# Patient Record
Sex: MALE | Race: WHITE | Employment: FULL TIME | URBAN - METROPOLITAN AREA
[De-identification: names, ages, dates, MRNs, and addresses within clinical notes are randomized per-mention and may not be internally consistent; named-entity substitution may affect disease eponyms.]

---

## 2022-03-03 ENCOUNTER — HOSPITAL ENCOUNTER (OUTPATIENT)
Dept: PHYSICAL THERAPY | Age: 32
Discharge: HOME | End: 2022-03-03
Payer: COMMERCIAL

## 2022-03-03 PROCEDURE — 97110 THERAPEUTIC EXERCISES: CPT

## 2022-03-03 PROCEDURE — 97161 PT EVAL LOW COMPLEX 20 MIN: CPT

## 2022-03-03 NOTE — PROGRESS NOTES
[facility]  [address] [place] [address] Marianne Vigil, [address] - Phone: [phone]  Fax: [phone] / [address] [facility]  Patient Name: Kemi Lamar : 1990   Medical   Diagnosis: Right leg pain [M79.604] Treatment Diagnosis: Closed Displaced Right Lateral Malleolus    Onset Date: DOI: 2022  DOS: 2022     Referral Source: NARESH Lugo [facility]): 3/3/2022   Prior Hospitalization: See medical history Provider #: [id number]   Prior Level of Function: Active Adult   Comorbidities: Unremarkable   Medications: Verified on Patient Summary List   The Plan of Care and following information is based on the information from the initial evaluation.   ==========================================================================================  Assessment / key information:  Pt is a 32year old male who presents to PT today status post closed displaced right lateral malleolus. The resulting condition has affected their ability to ambulate without difficulty. Patient with a Functional Status score of 47 on FOTO (Focused on Therapeutic Outcomes), which corresponds to a functional limitation of 53%. Pt was advised that he could perform therapy once a week until he could weight bear secondary to finances and authorized sessions. However, he  t Patient will benefit from skilled PT services to address these issues. Pt was provided a HEP today and educated regarding their diagnosis and prognosis.  Thank you for this referral.     ROM / Strength  []? Unable to assess                  AROM                      PROM                   Strength (1-5)      Right Left Right Right Right Left   Knee Flexion WFL NT  NT  NT  5 5     Extension WFL NT   NT  NT 5 5   Ankle Plantarflexion  40  55  42  NT 4- 5     Inversion 25 55 28  NT 4 5     Eversion 8 20 8  NT 4+ 5     Dorsiflexion (neutral) -5 10 -2  NT 5 5  Great Toe Extension  90  NT NT   NT  NT  NT     Flexion 30 NT NT  NT  NT NT         Girth Measurements in cm:      Malleoli Figure 8   Left 31  60   Right   28.5 59      ==========================================================================================  Eval Complexity: History: LOW Complexity : Zero comorbidities / personal factors that will impact the outcome / POCExam:MEDIUM Complexity : 3 Standardized tests and measures addressing body structure, function, activity limitation and / or participation in recreation  Presentation: LOW Complexity : Stable, uncomplicated  Clinical Decision Making:MEDIUM Complexity : FOTO score of 26-74Overall Complexity:LOW     Problem List: pain affecting function, decrease ROM, decrease strength, edema affecting function, impaired gait/ balance, decrease ADL/ functional abilitiies, decrease activity tolerance, decrease flexibility/ joint mobility and decrease transfer abilities   Treatment Plan may include any combination of the following: Therapeutic exercise, Therapeutic activities, Neuromuscular re-education, Physical agent/modality, Gait/balance training, Manual therapy, Patient education, Self Care training, Functional mobility training and Stair training  Patient / Family readiness to learn indicated by: asking questions, trying to perform skills and interest  Persons(s) to be included in education: patient (P)  Barriers to Learning/Limitations: None  Patient Goal (s): \"want to be able to snowboard again\"   Patient self reported health status: good  Rehabilitation Potential: excellent   Short Term Goals: To be accomplished in  4  weeks:  1. Pt will be compliant with HEP for symptom management at home. 2. Pt will demonstrate DF AROM to at least neutral to improve stance phase. 3. Pt will demonstrate Inversion AROM to at least 35 deg to improve gait training.  Long Term Goals: To be accomplished in  8  weeks:  1.  Pt will be independent with HEP at D/C for self management. 2. Pt will demonstrate DF AROM to at least 10 deg to improve stair negotiation. 3. Pt will ambulate 5 minutes without assistive device to progress to return to PLOF. 4. Pt will increase FOTO Functional Status score to 76 to decrease functional limitations. 5. Pt will demonstrate x10 standing heel raises to engage in age appropriate activities. Frequency / Duration:   Patient to be seen  2-3  times per week for 8  weeks:  Patient / Caregiver education and instruction: provided education regarding diagnosis and prognosis as well as details regarding treatment plain. activity modification, brace/ splint application and exercises  Therapist Signature: Jersey Stoner DPT Date: 0/3/0300   Certification Period: NA Time: 11:09 AM   ===========================================================================================  I certify that the above Physical Therapy Services are being furnished while the patient is under my care. I agree with the treatment plan and certify that this therapy is necessary. Physician Signature:        Date:       Time:     NARESH Marques  Please sign and return to [employer] or you may fax the signed copy to [phone]. Thank you.

## 2022-03-03 NOTE — PROGRESS NOTES
PHYSICAL THERAPY - DAILY TREATMENT NOTE    Patient Name: Laurie Klein        Date: 3/3/2022  : 1990   YES Patient  Verified  Visit #:     Insurance: Payor: Eleuterio Meza / Plan: [facility] PPO / Product Type: PPO /      In time: 496 Out time: 1050   Total Treatment Time: 35     Medicare/[facility] Time Tracking (below)   Total Timed Codes (min):  10 1:1 Treatment Time:  10     TREATMENT AREA =  Right Ankle    SUBJECTIVE    Pain Level (on 0 to 10 scale):  0  / 10   Medication Changes/New allergies or changes in medical history, any new surgeries or procedures? NO    If yes, update Summary List   Subjective Functional Status/Changes:  []  No changes reported   DOI: 2022 pt slipped on ice and fractured right fibular and tibia with torn ligaments. Had closed displaced fracture of lateral malleolous 2022. Reports recovery has been good and denies any drainage. Denies history of falls. Initially he did not think he broke it anything. However when attempting to stand he could not bear weight and went to ER. NWB for 10 weeks. MD is aware of patient's brace use. He states he only wear brace while ambulating. He takes it off at work. Denies any numbness/tingling in foot. Hobbies: snowboard, disc golf     HPI:  Symptoms:  Pain rating (0-10): Today: 0/10                        Best: 0/10                        Worst: 7/10                        [] Constant:                                          [] Intermittent:       History of Condition:      Aggravating Factors:       Alleviating Factors:      Previous Treatment:      PMHx:see chart     Social/Recreational/Work:     Functional Status  Prior level of function:  Present functional limitations:     Pt Goals: \"want to be able to snowboard again\"     OBJECTIVE     Physical Therapy Evaluation - Ankle/Foot        OBJECTIVE    ROM / Strength  [] Unable to assess                  AROM PROM                   Strength (1-5)      Right Left Right Right Right Left   Knee Flexion WFL    5 5     Extension WFL    5 5   Ankle Plantarflexion  40  55  42   4- 5    Inversion 25 55 28  4 5    Eversion 8 20 8  4+ 5    Dorsiflexion (neutral) -5 10 -2  5 5    Great Toe Extension  90  NT NT         Flexion 30 NT NT             Girth Measurements in cm: Malleoli Figure 8   Left 31  60   Right   28.5 59      10 min Therapeutic Exercise:  [x]  See flow sheet   Rationale:      increase ROM and increase strength to improve the patients ability to negotiate various environments in a safe and effective manner. min Patient Education:  YES  Reviewed HEP   []  Progressed/Changed HEP based on: Other Objective/Functional Measures:    See Above     Post Treatment Pain Level (on 0 to 10) scale:   0  / 10     ASSESSMENT    Assessment/Changes in Function:     See POC    Justification for Eval Code Complexity:  Patient History (low 0, mod 1-2, high 3-4): Low  Examination (low 1-2, mod 3+, high 4+): Mod (see above)  Clinical Presentation (low stable or uncomplicated, mod evolving or changing, high unstable or unpredictable): Low  Clinical Decision Making (low , mod 26-74, high 1-25): FOTO = Mod        []  See Progress Note/Recertification   Patient will continue to benefit from skilled PT services to analyze,, cue,, progress,, modify,, demonstrate,, instruct, and address, movement patterns,, therapeutic interventions,, postural abnormalities,, soft tissue restrictions,, ROM,, strength,, functional mobility,, body mechanics/ergonomics, and home and community integration, to attain remaining goals.    Progress toward goals / Updated goals:    See POC     PLAN  []  Upgrade activities as tolerated YES Continue plan of care   []  Discharge due to :    []  Other:      Therapist: Eloy Frankel, DPT    Date: 3/3/2022 Time: 9:37 AM     Future Appointments   Date Time Provider Swathi Olivares   3/3/2022 10:15 AM Jose Guadalupe Armenta [facility]   3/7/2022  2:45 PM Leann, [address]

## 2022-03-07 ENCOUNTER — HOSPITAL ENCOUNTER (OUTPATIENT)
Dept: PHYSICAL THERAPY | Age: 32
End: 2022-03-07
Payer: COMMERCIAL

## 2022-03-15 ENCOUNTER — HOSPITAL ENCOUNTER (OUTPATIENT)
Dept: PHYSICAL THERAPY | Age: 32
Discharge: HOME | End: 2022-03-15
Payer: COMMERCIAL

## 2022-03-15 PROCEDURE — 97140 MANUAL THERAPY 1/> REGIONS: CPT

## 2022-03-15 PROCEDURE — 97110 THERAPEUTIC EXERCISES: CPT

## 2022-03-15 PROCEDURE — 97016 VASOPNEUMATIC DEVICE THERAPY: CPT

## 2022-03-15 NOTE — PROGRESS NOTES
PHYSICAL THERAPY - DAILY TREATMENT NOTE    Patient Name: Kemi Lamar        Date: 3/15/2022  : 1990   YES Patient  Verified  Visit #:   2     Insurance: Payor: Mone Hayes / Plan: [facility] PPO / Product Type: PPO /      In time: 800 Out time: 853   Total Treatment Time: 53     Medicare/[employer] Time Tracking (below)   Total Timed Codes (min):  43 1:1 Treatment Time:  43     TREATMENT AREA =  Right leg pain [M79.604]    SUBJECTIVE    Pain Level (on 0 to 10 scale):  0  / 10   Medication Changes/New allergies or changes in medical history, any new surgeries or procedures? NO    If yes, update Summary List   Subjective Functional Status/Changes:  []  No changes reported     Josh Chavez reports compliance with HEP and no pain today. Compliance with HEP  Yes [x] No []         OBJECTIVE  Therapeutic Procedures:  Min Procedure Specifics + Rationale   33(33) [x] Therapeutic Exercise    See Flowsheet   Rationale: increase ROM and increase strength to improve the patients ability to participate in ADL's    10 [x]  Manual Therapy        Right Talocrural Distraction, AP, PA Mobilization  PROM in all planes at forefoot, midfoot and rear foot  Great toe extension/flexion PROM     Rationale: decrease pain, increase ROM, increase tissue extensibility, decrease trigger points and increase postural awareness to attain functional use and participation with ADL's    The manual therapy interventions were performed at a separate and distinct time from the therapeutic activities interventions.            Modality rationale: decrease inflammation, decrease pain, increase tissue extensibility and increase muscle contraction/control to improve the patients ability to perform ADL's with greater ease   Time: 13' Additional Details    [x]  Vasopneumatic Device   Lowest press/coldest temp       Malleoli (cm)   Left 31    Right   28.5     Vasopnuematic compression justification:  Per bilateral girth measures taken and listed above the edema is considered significant and having an impact on the patient's strength, balance and gait                            Location  [] Right Knee []Left Shoulder  [] Left Knee []Right Ankle  [] Right Shoulder [] Left Ankle    Skin assessment post-treatment:  intact no adverse reaction          min Patient Education:  YES Reviewed HEP         Other Objective/Functional Measures:    See flowsheet for more details    Patient Education regarding the following during session:  1.prognosis    Mod/min VC and demos required throughout session for proper form and increased safety         Post Treatment Pain Level (on 0 to 10) scale:   1  / 10     ASSESSMENT    Assessment/Changes in Function:     Tyler Spicer responded well to initiation of plan of care. Performed movements in non-weightbearing per MD recommendations. []  See Progress Note/Recertification   Patient will continue to benefit from skilled PT services to analyze,, cue,, progress,, modify,, demonstrate,, instruct, and address, movement patterns,, therapeutic interventions,, postural abnormalities,, soft tissue restrictions,, ROM,, strength,, functional mobility,, body mechanics/ergonomics, and home and community integration, to attain remaining goals.    Progress toward goals / Updated goals:    1st session since initial evaluation, no notable progress yet     PLAN    [x]  Upgrade activities as tolerated YES Continue plan of care   []  Discharge due to :    []  Other:      Therapist: Samuel Pereyra DPT    Date: 3/15/2022 Time: 8:22 AM     Future Appointments   Date Time Provider Swathi Olivares   3/18/2022  8:00 AM Livingston Romance [facility]   3/22/2022 10:15 AM Livingston Romance [facility]   3/25/2022  8:45 AM [facility]   3/29/2022  9:30 AM [facility]   3/31/2022  8:45 AM Sapna Noriega [address]

## 2022-03-22 ENCOUNTER — HOSPITAL ENCOUNTER (OUTPATIENT)
Dept: PHYSICAL THERAPY | Age: 32
Discharge: HOME | End: 2022-03-22
Payer: COMMERCIAL

## 2022-03-22 PROCEDURE — 97110 THERAPEUTIC EXERCISES: CPT

## 2022-03-22 PROCEDURE — 97140 MANUAL THERAPY 1/> REGIONS: CPT

## 2022-03-22 PROCEDURE — 97016 VASOPNEUMATIC DEVICE THERAPY: CPT

## 2022-03-22 NOTE — PROGRESS NOTES
PHYSICAL THERAPY - DAILY TREATMENT NOTE    Patient Name: Shona Melendrez        Date: 3/22/2022  : 1990   YES Patient  Verified  Visit #:   3     Insurance: Payor: Melani Valadez / Plan: [facility] PPO / Product Type: PPO /      In time: 5974 Out time: 111   Total Treatment Time: 49     Medicare/[employer] Time Tracking (below)   Total Timed Codes (min):  39 1:1 Treatment Time:  39     TREATMENT AREA =  Right leg pain [M79.604]    SUBJECTIVE    Pain Level (on 0 to 10 scale):  0  / 10   Medication Changes/New allergies or changes in medical history, any new surgeries or procedures? NO    If yes, update Summary List   Subjective Functional Status/Changes:  []  No changes reported     Pt arrives to session 8 minutes late denying any pain. Reported little soreness following last session. Compliance with HEP  Yes [x] No []         OBJECTIVE  Therapeutic Procedures:  Min Procedure Specifics + Rationale   29(29) [x] Therapeutic Exercise    See Flowsheet   Rationale: increase ROM and increase strength to improve the patients ability to participate in ADL's    10 [x]  Manual Therapy          Right Talocrural Distraction, AP, PA Mobilization  PROM in all planes at forefoot, midfoot and rear foot  Great toe extension/flexion PROM      Rationale: decrease pain, increase ROM, increase tissue extensibility, decrease trigger points and increase postural awareness to attain functional use and participation with ADL's    The manual therapy interventions were performed at a separate and distinct time from the therapeutic activities interventions.                 Modality rationale: decrease inflammation, decrease pain, increase tissue extensibility and increase muscle contraction/control to improve the patients ability to perform ADL's with greater ease   Time: 13' Additional Details     [x]?   Vasopneumatic Device   Lowest press/coldest temp        Malleoli (cm)   Left 31    Right   28.5    Vasopnuematic compression justification:  Per bilateral girth measures taken and listed above the edema is considered significant and having an impact on the patient's strength, balance and gait                            Location  []? Right Knee []? Left Shoulder  []? Left Knee []? Right Ankle  []? Right Shoulder []? Left Ankle     Skin assessment post-treatment:  intact no adverse reaction   min Patient Education:  YES Reviewed HEP         Other Objective/Functional Measures:    See flowsheet for more details    Added SLR 4 way, arch doming and toe yoga    Mod/min VC and demos required throughout session for proper form and increased safety         Post Treatment Pain Level (on 0 to 10) scale:   0  / 10     ASSESSMENT    Assessment/Changes in Function:     Pt responding well to current plan and was recommended to  frequency of sessions to once a week until WB is allowed. []  See Progress Note/Recertification   Patient will continue to benefit from skilled PT services to analyze,, cue,, progress,, modify,, demonstrate,, instruct, and address, movement patterns,, therapeutic interventions,, postural abnormalities,, soft tissue restrictions,, ROM,, strength,, functional mobility,, body mechanics/ergonomics, and home and community integration, to attain remaining goals. Progress toward goals / Updated goals: · Short Term Goals: To be accomplished in  4  weeks:  1. Pt will be compliant with HEP for symptom management at home. 2. Pt will demonstrate DF AROM to at least neutral to improve stance phase. Addressed with manual  3. Pt will demonstrate Inversion AROM to at least 35 deg to improve gait training. · Long Term Goals: To be accomplished in  8  weeks:  1. Pt will be independent with HEP at D/C for self management. 2. Pt will demonstrate DF AROM to at least 10 deg to improve stair negotiation. 3. Pt will ambulate 5 minutes without assistive device to progress to return to PLOF.   4. Pt will increase FOTO Functional Status score to 76 to decrease functional limitations. 5. Pt will demonstrate x10 standing heel raises to engage in age appropriate activities.       PLAN    [x]  Upgrade activities as tolerated YES Continue plan of care   []  Discharge due to :    []  Other:      Therapist: Eloy Frankel, DPT    Date: 3/22/2022 Time: 10:09 AM     Future Appointments   Date Time Provider Swathi Olivares   3/22/2022 10:15 AM Rick Applebaum [facility]   3/25/2022  8:45 AM Rick Applebaum [facility]   3/29/2022  9:30 AM Rick Applebaum [facility]   3/31/2022  8:45 AM Leann, S[address]

## 2022-03-25 ENCOUNTER — APPOINTMENT (OUTPATIENT)
Dept: PHYSICAL THERAPY | Age: 32
End: 2022-03-25
Payer: COMMERCIAL

## 2022-03-29 ENCOUNTER — HOSPITAL ENCOUNTER (OUTPATIENT)
Dept: PHYSICAL THERAPY | Age: 32
Discharge: HOME | End: 2022-03-29
Payer: COMMERCIAL

## 2022-03-29 PROCEDURE — 97140 MANUAL THERAPY 1/> REGIONS: CPT

## 2022-03-29 PROCEDURE — 97110 THERAPEUTIC EXERCISES: CPT

## 2022-03-29 NOTE — PROGRESS NOTES
PHYSICAL THERAPY - DAILY TREATMENT NOTE    Patient Name: Christy Flanagan        Date: 3/29/2022  : 1990   YES Patient  Verified  Visit #:   4     Insurance: Payor: Jericho Orta / Plan: [facility] PPO / Product Type: PPO /      In time: 180 Out time: 1008   Total Treatment Time: 46     Medicare/[employer] Time Tracking (below)   Total Timed Codes (min):  46 1:1 Treatment Time:  46     TREATMENT AREA =  Right leg pain [M79.604]    SUBJECTIVE    Pain Level (on 0 to 10 scale):  0  / 10   Medication Changes/New allergies or changes in medical history, any new surgeries or procedures? NO    If yes, update Summary List   Subjective Functional Status/Changes:  []  No changes reported     Genaro Ramirez states that his MD was happy with his progress and will begin weightbearing on . Instructed to walk on boot and try to wean off it within 2 weeks. Compliance with HEP  Yes [] No []         OBJECTIVE  Therapeutic Procedures:  Min Procedure Specifics + Rationale   36(36) [x] Therapeutic Exercise    See Flowsheet   Rationale: increase ROM and increase strength to improve the patients ability to participate in ADL's    10 [x]  Manual Therapy            Right Talocrural Distraction, AP, PA Mobilization  PROM in all planes at forefoot, midfoot and rear foot  Great toe extension/flexion PROM      Rationale: decrease pain, increase ROM, increase tissue extensibility, decrease trigger points and increase postural awareness to attain functional use and participation with ADL's    The manual therapy interventions were performed at a separate and distinct time from the therapeutic activities interventions.         min Patient Education:  YES Reviewed HEP         Other Objective/Functional Measures:    See flowsheet for more details    Patient Education regarding the following during session:  1.progress made thus far       VC and demos required throughout session for proper form and increased safety         Post Treatment Pain Level (on 0 to 10) scale:   0  / 10     ASSESSMENT    Assessment/Changes in Function:     Stan Toro is demonstrating great deals of improvement in AROM since start of care. []  See Progress Note/Recertification   Patient will continue to benefit from skilled PT services to analyze,, cue,, progress,, modify,, demonstrate,, instruct, and address, movement patterns,, therapeutic interventions,, postural abnormalities,, soft tissue restrictions,, ROM,, strength,, functional mobility,, body mechanics/ergonomics, and home and community integration, to attain remaining goals. Progress toward goals / Updated goals:    1. Pt will be compliant with HEP for symptom management at home. 2. Pt will demonstrate DF AROM to at least neutral to improve stance phase. Addressed with MT  3. Pt will demonstrate Inversion AROM to at least 35 deg to improve gait training.   Addressed with MT     PLAN    [x]  Upgrade activities as tolerated YES Continue plan of care   []  Discharge due to :    []  Other:      Therapist: Pricilla Cortez DPT    Date: 3/29/2022 Time: 8:53 AM     Future Appointments   Date Time Provider Swathi Olivares   3/29/2022  9:30 AM [address] [facility]   4/4/2022 10:[facility]   4/7/2022  9:30 [facility]   4/11/2022 10:15 AM Tommie Cumberland [facility]   4/14/2022 10:15 AM Tommie Roxanne [facility]   4/18/2022 10:[facility]   4/21/2022 10:15 [facility]   4/25/2022 10:15 [facility]   4/28/2022 10:15 AM Sapna Noriega [facility] [address]

## 2022-03-31 ENCOUNTER — APPOINTMENT (OUTPATIENT)
Dept: PHYSICAL THERAPY | Age: 32
End: 2022-03-31
Payer: COMMERCIAL

## 2022-04-04 ENCOUNTER — HOSPITAL ENCOUNTER (OUTPATIENT)
Dept: PHYSICAL THERAPY | Age: 32
Discharge: HOME | End: 2022-04-04
Payer: COMMERCIAL

## 2022-04-04 PROCEDURE — 97110 THERAPEUTIC EXERCISES: CPT

## 2022-04-04 NOTE — PROGRESS NOTES
[id number] [facility] PHYSICAL THERAPY  [address] [facility] [address], [address] - Phone: [phone]  Fax: [phone]  Progress Note/CONTINUED PLAN OF CARE for PHYSICAL THERAPY          Patient Name: Christy Flanagan : 1990   Treatment/Medical Diagnosis: Right leg pain [M79.604]   Referral Source: Gianna Mean, [place] Start of Care [facility], [place]): 3/3/2022   Prior Hospitalization: See Medical History Provider #:  [id number]   Medications: Verified on Patient Summary List   Visits from [facility]: 4 Missed Visits: 2   GOALS               Goal/Measure of Progress Goal Met? 1.  Pt will be compliant with HEP for symptom management at home. Status at last Eval: HEP Provided Current Status: Compliant MET   2.  Pt will demonstrate DF AROM to at least neutral to improve stance phase. Status at last Eval: -5 deg Current Status: 5 deg past neutral MET   3.  Pt will demonstrate Inversion AROM to at least 35 deg to improve gait training. Status at last Eval: 25 deg Current Status: 40 deg MET              4.  Pt will increase FOTO Functional Status score to 76 to decrease functional limitations. Status at last Eval: 47/100 Current Status: 55/100 NOT MET   5.  Pt will ambulate 5 minutes without assistive device to progress to return to PLOF. Status at last Eval: None Current Status: Ambulated with [place] NOT MET        SUMMARY OF TREATMENT  Patient's POC has consisted of therex, therapeutic activities, manual therapy prn, modalities prn, pt. education, and a comprehensive HEP. Treatment strategies used to address functional mobility deficits, ROM deficits, strength deficits, analyze and address soft tissue restrictions, analyze and cue movement patterns, analyze and modify body mechanics/ergonomics, assess and modify postural abnormalities and instruct in home and community integration. CURRENT STATUS  Genaro Ramirez reports that MD informed him to start transitioning to weight bearing with boot today. He is reporting increased pain and swelling starting today. Current pain is a 2-3 out of 10. He was instructed on correct/safe gait training with cane and demonstrated good tolerance with walking. AROM and strength was reassessed and demonstrated moderate improvements in all planes. However eversion AROM did not improve since evaluation.    )      AROM Strenth   Knee Flexion WFL 5     Extension WFL 5   Ankle Plantarflexion  55 4+     Inversion 40 5     Eversion 8 4+     Dorsiflexion (neutral) 5 5      Girth Measurements in cm:      [place] Figure 8   Right  31 60.5        New Goals to be achieved in   4-6  weeks:  1. Pt will be independent with HEP at D/C for self management. 2. Pt will demonstrate DF AROM to at least 10 deg to improve stair negotiation. 3. Pt will ambulate 5 minutes without assistive device to progress to return to PLOF. 4. Pt will increase FOTO Functional Status score to 76 to decrease functional limitations. 5. Pt will demonstrate x10 standing heel raises to engage in age appropriate activities. Frequency / Duration:   Patient to be seen   2-1   times per week for   4-6    weeks:    RECOMMENDATIONS: Continue and progress functional therex/therapeutic activity as able, utilizing manual therapy and modalities prn. Progress patient towards independent HEP to facilitate self-management of symptoms and progress gains after PT. If you have any questions/comments please contact us directly at [phone]. Thank you for allowing us to assist in the care of your patient. Therapist Signature: Tobi Jameson DPT Date: 6/6/0443   Certification Period:  Reporting Period: NA  NA Time: 10:40 AM   NOTE TO PHYSICIAN:  PLEASE COMPLETE THE ORDERS BELOW AND FAX TO   [facility] Physical [facility]: [phone]. If you are unable to process this request in 24 hours please contact our office: [phone].    ___ I have read the above report and request that my patient continue as recommended.    ___ I have read the above report and request that my patient continue therapy with the following changes/special instructions: ________________________________________________   ___ I have read the above report and request that my patient be discharged from therapy.      Physician Signature:        Date:       Time:    Ryan Ledezma

## 2022-04-04 NOTE — PROGRESS NOTES
PHYSICAL THERAPY - DAILY TREATMENT NOTE    Patient Name: Ashley Acuña        Date: 2022  : 1990   YES Patient  Verified  Visit #:   5     Insurance: Payor: Huong Nix / Plan: [facility] PPO / Product Type: PPO /      In time: 1020 Out time: 1110   Total Treatment Time: 50     Medicare/BCBS Time Tracking (below)   Total Timed Codes (min):  40 1:1 Treatment Time:  40     TREATMENT AREA =  Right leg pain [M79.604]    SUBJECTIVE    Pain Level (on 0 to 10 scale):  2-3  / 10   Medication Changes/New allergies or changes in medical history, any new surgeries or procedures? NO    If yes, update Summary List   Subjective Functional Status/Changes:  []  No changes reported     Vianca Evangelina reports that MD informed him to start transitioning to weight bearing with boot today. He is reporting increased pain and swelling starting today. Current pain is a 2-3 out of 10. Compliance with HEP  Yes [] No []         OBJECTIVE  Therapeutic Procedures:  Min Procedure Specifics + Rationale   32(32) [x] Therapeutic Exercise    See Flowsheet   Rationale: increase ROM and increase strength to improve the patients ability to participate in ADL's      8 [x] Gait Training See Flow Sheet  Cane adjustments and safe gait training afterwards. Mod I for 100ft with cane    Rationale:   To improve functional mechanics associated with gait training on even and uneven surfaces to promote mobility           Modality rationale: decrease inflammation, decrease pain, increase tissue extensibility and increase muscle contraction/control to improve the patients ability to perform ADL's with greater ease   Time: 10' Additional Details    [x]  Cold Pack                           Location  [] Right Knee []Left Shoulder  [] Left Knee [x]Right Ankle  [] Right Shoulder [] Left Ankle    Skin assessment post-treatment:  intact no adverse reaction          min Patient Education:  YES Reviewed HEP         Other Objective/Functional Measures:    See flowsheet for more details    Patient Education regarding the following during session:  1. progess made thus far  2. Gait training    Updated HEP     VC and demos required throughout session for proper form and increased safety       Post Treatment Pain Level (on 0 to 10) scale:   0  / 10     ASSESSMENT    Assessment/Changes in Function:     Cali Lemus reports that MD informed him to start transitioning to weight bearing with boot today. He is reporting increased pain and swelling starting today. Current pain is a 2-3 out of 10. He was instructed on correct/safe gait training with cane and demonstrated good tolerance with walking. AROM and strength was reassessed and demonstrated moderate improvements in all planes. However eversion AROM did not improve since evaluation. []  See Progress Note/Recertification   Patient will continue to benefit from skilled PT services to analyze,, cue,, progress,, modify,, demonstrate,, instruct, and address, movement patterns,, therapeutic interventions,, postural abnormalities,, soft tissue restrictions,, ROM,, strength,, functional mobility,, body mechanics/ergonomics, and home and community integration, to attain remaining goals. Progress toward goals / Updated goals:            Goal/Measure of Progress Goal Met? 1. Pt will be compliant with HEP for symptom management at home. Status at last Eval: HEP Provided Current Status: Compliant MET   2. Pt will demonstrate DF AROM to at least neutral to improve stance phase. Status at last Eval: -5 deg Current Status: 5 deg past neutral MET   3. Pt will demonstrate Inversion AROM to at least 35 deg to improve gait training. Status at last Eval: 25 deg Current Status: 40 deg MET      4. Pt will increase FOTO Functional Status score to 76 to decrease functional limitations. Status at last Eval: 47/100 Current Status: 55/100 NOT MET   5.   Pt will ambulate 5 minutes without assistive device to progress to return to PLOF.    Status at last Eval: None Current Status: Ambulated with [place]zy[place] Bi NOT MET         PLAN    [x]  Upgrade activities as tolerated YES Continue plan of care   []  Discharge due to :    []  Other:      Therapist: Dano Crawley DPT    Date: 4/4/2022 Time: 10:13 AM     Future Appointments   Date Time Provider Swathi Olivares   4/4/2022 10:15 AM [facility]   4[facility]7/2022  [facility]   4/11/2022 10:15 AM [facility] [facility]   4/14/2022 10:15 AM [facility] [place] [facility]   4/18/2022 10:15 AM [facility] [place] [facility]   4/21/2022 10:15 AM [facility]   4/25/2022 10:15 AM [facility] [facility]   4/28/2022 10:15 AM Sapna Noriega [address]

## 2022-04-07 ENCOUNTER — HOSPITAL ENCOUNTER (OUTPATIENT)
Dept: PHYSICAL THERAPY | Age: 32
Discharge: HOME | End: 2022-04-07
Payer: COMMERCIAL

## 2022-04-07 PROCEDURE — 97116 GAIT TRAINING THERAPY: CPT

## 2022-04-07 PROCEDURE — 97110 THERAPEUTIC EXERCISES: CPT

## 2022-04-07 NOTE — PROGRESS NOTES
PHYSICAL THERAPY - DAILY TREATMENT NOTE    Patient Name: Christy Flanagan        Date: 2022  : 1990   YES Patient  Verified  Visit #:     Insurance: Payor: Jericho Orta / Plan: [facility] PPO / Product Type: PPO /      In time: 930 Out time: 1013   Total Treatment Time: 43     Medicare/BCBS Time Tracking (below)   Total Timed Codes (min):  43 1:1 Treatment Time:  43     TREATMENT AREA =  Right leg pain [M79.604]    SUBJECTIVE    Pain Level (on 0 to 10 scale):  2-3  / 10   Medication Changes/New allergies or changes in medical history, any new surgeries or procedures? NO    If yes, update Summary List   Subjective Functional Status/Changes:  []  No changes reported     Genaro Ramirez reports that he has been trying to walk in shoes around home with difficulty. Walking without shoes is near impossible according to his reports. Remains compliant HEP. OBJECTIVE  Therapeutic Procedures:  Min Procedure Specifics + Rationale   33(33) [x] Therapeutic Exercise    See Flowsheet   Rationale: increase ROM and increase strength to improve the patients ability to participate in ADL's      10 [x] Gait Training See Flow Sheet  Weight Shifts in Normal and Tandem Stance for 1 minute each  Weight Shifts on Balance Board 1 minute each  Cued for push off and decrease compensatory ER for foot clearance during 100ft gait training   Vaughn Step Over Large x 15  Rationale:   To improve functional mechanics associated with gait training on even and uneven surfaces to promote mobility      min Patient Education:  YES Reviewed HEP         Other Objective/Functional Measures:    See flowsheet for more details         VC and demos required throughout session for proper form and increased safety         Post Treatment Pain Level (on 0 to 10) scale:   3  / 10     ASSESSMENT    Assessment/Changes in Function:     Pt responded well to current program without any significant increases to pain with standing movements. []  See Progress Note/Recertification   Patient will continue to benefit from skilled PT services to analyze,, cue,, progress,, modify,, demonstrate,, instruct, and address, movement patterns,, therapeutic interventions,, postural abnormalities,, soft tissue restrictions,, ROM,, strength,, functional mobility,, body mechanics/ergonomics, and home and community integration, to attain remaining goals. Progress toward goals / Updated goals:    1st session since progress note, no notable progress yet.       PLAN    [x]  Upgrade activities as tolerated YES Continue plan of care   []  Discharge due to :    []  Other:      Therapist: Roney Azevedo DPT    Date: 4/7/2022 Time: 9:34 AM     Future Appointments   Date Time Provider Swathi Olivares   4/11/2022 10:15 AM [facility]   4/14/2022 10:15 AM [facility]   4/18/2022 10:15 AM [facility]   4/21/2022 10:15 AM [facility]   4/25/2022 10:15 AM [facility]   4/28/2022 10:15 AM Sapna Noriega [address]

## 2022-04-11 ENCOUNTER — HOSPITAL ENCOUNTER (OUTPATIENT)
Dept: PHYSICAL THERAPY | Age: 32
Discharge: HOME | End: 2022-04-11
Payer: COMMERCIAL

## 2022-04-11 PROCEDURE — 97116 GAIT TRAINING THERAPY: CPT

## 2022-04-11 PROCEDURE — 97110 THERAPEUTIC EXERCISES: CPT

## 2022-04-11 PROCEDURE — 97112 NEUROMUSCULAR REEDUCATION: CPT

## 2022-04-11 NOTE — PROGRESS NOTES
PHYSICAL THERAPY - DAILY TREATMENT NOTE    Patient Name: Maren Madison        Date: 2022  : 1990   YES Patient  Verified  Visit #:     Insurance: Payor: Irene Jackson / Plan: [facility] PPO / Product Type: PPO /      In time: 8318 Out time: 1113   Total Treatment Time: 58     Medicare/BCBS Time Tracking (below)   Total Timed Codes (min):  58 1:1 Treatment Time:  58     TREATMENT AREA =  Right leg pain [M79.604]    SUBJECTIVE    Pain Level (on 0 to 10 scale):  0  / 10   Medication Changes/New allergies or changes in medical history, any new surgeries or procedures? NO    If yes, update Summary List   Subjective Functional Status/Changes:  []  No changes reported     Pt states that he is sore but not experiencing pain. Reports feeling\"fine\" after last session. Soreness returned yesterday    Compliance with HEP  Yes [x] No []         OBJECTIVE  Therapeutic Procedures:  Min Procedure Specifics + Rationale   28(28) [x] Therapeutic Exercise    See Flowsheet   Rationale: increase ROM and increase strength to improve the patients ability to participate in ADL's      10 [x] Gait Training See Flow Sheet    Weight Shifts in Normal and Tandem Stance for 1 minute each  SS 2 x 30ft    Rationale: To improve functional mechanics associated with gait training on even and uneven surfaces to promote mobility     10 [x] Neuromuscular Re-Education See Flow Sheet  Rebounder in Tandem 2 x 15 each position  HK Marches 2 x 30' each    Rationale: To improve stability over various surfaces to decrease falls risk. Improve core control and posture control in various positions to maximize level of function.         min Patient Education:  YES Reviewed HEP         Other Objective/Functional Measures:    See flowsheet for more details    See above and below     VC and demos required throughout session for proper form and increased safety         Post Treatment Pain Level (on 0 to 10) scale:   1  / 10     ASSESSMENT    Assessment/Changes in Function:     Cali Lemus is progressing well towards increasing weight bearing tolerance and reporting several days of decreased symptoms following sessions. Demonstrates poor single leg balance. []  See Progress Note/Recertification   Patient will continue to benefit from skilled PT services to analyze,, cue,, progress,, modify,, demonstrate,, instruct, and address, movement patterns,, therapeutic interventions,, postural abnormalities,, soft tissue restrictions,, ROM,, strength,, functional mobility,, body mechanics/ergonomics, and home and community integration, to attain remaining goals. Progress toward goals / Updated goals:     Addressed strength and ROM with TE movements      PLAN    [x]  Upgrade activities as tolerated YES Continue plan of care   []  Discharge due to :    []  Other:      Therapist: Rosalva Li DPT    Date: 4/11/2022 Time: 9:52 AM     Future Appointments   Date Time Provider Swathi Olivares   4/11/2022 10:15 AM [facility]   4/14/2022 10:15 AM [facility]   4/18/2022 10:15 AM [facility]   4/21/2022 10:15 AM [facility]   4/25/2022 10:15 AM Mayme [facility]   4/28/2022 10:15 AM Leann, Sapna [address]

## 2022-04-14 ENCOUNTER — HOSPITAL ENCOUNTER (OUTPATIENT)
Dept: PHYSICAL THERAPY | Age: 32
Discharge: HOME | End: 2022-04-14
Payer: COMMERCIAL

## 2022-04-14 PROCEDURE — 97110 THERAPEUTIC EXERCISES: CPT

## 2022-04-14 PROCEDURE — 97112 NEUROMUSCULAR REEDUCATION: CPT

## 2022-04-14 NOTE — PROGRESS NOTES
PHYSICAL THERAPY - DAILY TREATMENT NOTE    Patient Name: Vipul Hanley        Date: 2022  : 1990   YES Patient  Verified  Visit #:     Insurance: Payor: Briseyda Srivastava / Plan: [facility] PPO / Product Type: PPO /      In time: 5490 Out time: 1116   Total Treatment Time: 61     Medicare/BCBS Time Tracking (below)   Total Timed Codes (min):  61 1:1 Treatment Time:  53     TREATMENT AREA =  Right leg pain [M79.604]    SUBJECTIVE    Pain Level (on 0 to 10 scale):  0  / 10   Medication Changes/New allergies or changes in medical history, any new surgeries or procedures? NO    If yes, update Summary List   Subjective Functional Status/Changes:  []  No changes reported     Stephanie Mercado stated that he felt better after last session and has been walking better. OBJECTIVE  Therapeutic Procedures:  Min Procedure Specifics + Rationale   51(43) [x] Therapeutic Exercise    See Flowsheet   Rationale: increase ROM and increase strength to improve the patients ability to participate in ADL's      10 [x] Neuromuscular Re-Education See Flow Sheet   Knee Taps on Bolster c BUE Support 2 x10  Walking Marches 60'  [place] 60'  Rationale: To improve stability over various surfaces to decrease falls risk. Improve core control and posture control in various positions to maximize level of function. min Patient Education:  YES Reviewed HEP         Other Objective/Functional Measures:    See flowsheet for more details    Fair single leg balance assessed during session  Self DF mobs with green band improved squat depth and pain perception       VC and demos required throughout session for proper form and increased safety         Post Treatment Pain Level (on 0 to 10) scale:   0  / 10     ASSESSMENT    Assessment/Changes in Function:   Continues to improve in functional mobility and strength each session.       []  See Progress Note/Recertification   Patient will continue to benefit from skilled PT services to analyze,, cue,, progress,, modify,, demonstrate,, instruct, and address, movement patterns,, therapeutic interventions,, postural abnormalities,, soft tissue restrictions,, ROM,, strength,, functional mobility,, body mechanics/ergonomics, and home and community integration, to attain remaining goals. Progress toward goals / Updated goals:    1. Pt will be independent with HEP at D/C for self management. 2. Pt will demonstrate DF AROM to at least 10 deg to improve stair negotiation. Added DF mobs  3. Pt will ambulate 5 minutes without assistive device to progress to return to PLOF. 4. Pt will increase FOTO Functional Status score to 76 to decrease functional limitations. 5. Pt will demonstrate x10 standing heel raises to engage in age appropriate activities.       PLAN    [x]  Upgrade activities as tolerated YES Continue plan of care   []  Discharge due to :    []  Other:      Therapist: Marcus Luu DPT    Date: 4/14/2022 Time: 9:49 AM     Future Appointments   Date Time Provider Swathi Olivares   4/14/2022 10:15 AM [facility]   4/18/2022 10:15 AM [facility]   4/21/2022 10:15 AM [facility]   4/25/2022 10:15 AM [facility]   4/28/2022 10:15 AM Sapna Noriega [address]

## 2022-04-18 ENCOUNTER — HOSPITAL ENCOUNTER (OUTPATIENT)
Dept: PHYSICAL THERAPY | Age: 32
Discharge: HOME | End: 2022-04-18
Payer: COMMERCIAL

## 2022-04-18 PROCEDURE — 97110 THERAPEUTIC EXERCISES: CPT

## 2022-04-18 NOTE — PROGRESS NOTES
PHYSICAL THERAPY - DAILY TREATMENT NOTE    Patient Name: Augustine Bowens        Date: 2022  : 1990   YES Patient  Verified  Visit #:     Insurance: Payor: Xiomara Blanchard / Plan: [facility] PPO / Product Type: PPO /      In time: 1012 Out time: 1102   Total Treatment Time: 50     Medicare/BCBS Time Tracking (below)   Total Timed Codes (min):  50 1:1 Treatment Time:  38     TREATMENT AREA =  Right leg pain [M79.604]    SUBJECTIVE    Pain Level (on 0 to 10 scale):  1  / 10   Medication Changes/New allergies or changes in medical history, any new surgeries or procedures? NO    If yes, update Summary List   Subjective Functional Status/Changes:  []  No changes reported     [place] reports that he has been feeling good after therapy sessions. However every 3 days, he experiences increased soreness. Arrives to session without boot. Compliance with HEP  Yes [x] No []         OBJECTIVE  Therapeutic Procedures:  Min Procedure Specifics + Rationale   30(28) [x] Therapeutic Exercise    See Flowsheet   Rationale: increase ROM and increase strength to improve the patients ability to participate in ADL's      10 [x] Neuromuscular Re-Education See Flow Sheet    Knee Taps on Bolster c ABDIRIZAK Support 2 x10 (1inch away)  Walking Marches 60'  [place] 60'    Rationale: To improve stability over various surfaces to decrease falls risk. Improve core control and posture control in various positions to maximize level of function.         min Patient Education:  YES Reviewed HEP         Other Objective/Functional Measures:    See flowsheet for more details    Added walking lunges and Airex hip 3 way  Self DF mobs with green band improved squat depth and pain perception     VC and demos required throughout session for proper form and increased safety         Post Treatment Pain Level (on 0 to 10) scale:   0  / 10     ASSESSMENT    Assessment/Changes in Function:     Excellent improvement with balance today. onle required ABDIRIZAK support for bolster taps      []  See Progress Note/Recertification   Patient will continue to benefit from skilled PT services to analyze,, cue,, progress,, modify,, demonstrate,, instruct, and address, movement patterns,, therapeutic interventions,, postural abnormalities,, soft tissue restrictions,, ROM,, strength,, functional mobility,, body mechanics/ergonomics, and home and community integration, to attain remaining goals. Progress toward goals / Updated goals:      1. Pt will be independent with HEP at D/C for self management. 2. Pt will demonstrate DF AROM to at least 10 deg to improve stair negotiation. Continuing with self posterior talocrural mobilization   3. Pt will ambulate 5 minutes without assistive device to progress to return to PLOF.  MET: ambulated during entire session without AD  4. Pt will increase FOTO Functional Status score to 76 to decrease functional limitations.   5. Pt will demonstrate x10 standing heel raises to engage in age appropriate activities.      PLAN    [x]  Upgrade activities as tolerated YES Continue plan of care   []  Discharge due to :    []  Other:      Therapist: Jose Guadalupe Elliott DPT    Date: 4/18/2022 Time: 10:14 AM     Future Appointments   Date Time Provider Swathi Olivares   4/18/2022 10:15 AM Chitra Drop [facility]   4/21/2022 10:15 AM Chitra Drop [facility]   4/25/2022 10:15 AM Chitra Drop [facility]   4/28/2022 10:15 AM Chitra Drop [facility]   5/3/2022  8:00 AM Chitra Drop [facility]   5/5/2022  8:00 AM Chitra Drop [facility]   5/10/2022  8:00 AM Chitra Drop [facility]   5/12/2022  8:00 AM Chitra Drop [facility]   5/17/2022  8:00 AM Chitra Drop [facility]   5/19/2022  8:00 AM Chitra Drop [facility]   5/24/2022  8:00 AM Chitra Drop [facility]   5/26/2022  8:00 AM Chitra Drop [facility]   5/31/2022  8:00 AM [address], [address] [place] [address] [address]

## 2022-04-21 ENCOUNTER — HOSPITAL ENCOUNTER (OUTPATIENT)
Dept: PHYSICAL THERAPY | Age: 32
Discharge: HOME | End: 2022-04-21
Payer: COMMERCIAL

## 2022-04-21 PROCEDURE — 97110 THERAPEUTIC EXERCISES: CPT

## 2022-04-21 NOTE — PROGRESS NOTES
PHYSICAL THERAPY - DAILY TREATMENT NOTE    Patient Name: Keena Henry        Date: 2022  : 1990   YES Patient  Verified  Visit #:   10   of   16-24  Insurance: Payor: Rajat Tafoya / Plan: [facility] PPO / Product Type: PPO /      In time: 495 Out time: 1110   Total Treatment Time: 55     Medicare/[facility] Time Tracking (below)   Total Timed Codes (min):  55 1:1 Treatment Time:  40     TREATMENT AREA =  Right leg pain [M79.604]    SUBJECTIVE    Pain Level (on 0 to 10 scale):  0  / 10   Medication Changes/New allergies or changes in medical history, any new surgeries or procedures? NO    If yes, update Summary List   Subjective Functional Status/Changes:  []  No changes reported     Pt reports that MD discharged him and told him that the Fx was fully healed. He is not having any difficulty with ascending or descending stairs. OBJECTIVE  Therapeutic Procedures:  Min Procedure Specifics + Rationale   55(40) [x] Therapeutic Exercise    See Flowsheet   Rationale: increase ROM and increase strength to improve the patients ability to participate in ADL's              min Patient Education:  YES Reviewed HEP         Other Objective/Functional Measures:    See flowsheet for more details    Plyometric Progression  1. 2 x10 Wall taps  2. 10x Broad Jumps  3. 10x Line Jumps  4. 10x 90 deg Jumps  5. 2 to 1 Landing   Notes: decreased eccentric control upon right SL landing. VC and demos required throughout session for proper form and increased safety         Post Treatment Pain Level (on 0 to 10) scale:   0  / 10     ASSESSMENT    Assessment/Changes in Function:     Pt demonstrates poor plyometric and balance stability. No pain within session.       []  See Progress Note/Recertification   Patient will continue to benefit from skilled PT services to analyze,, cue,, progress,, modify,, demonstrate,, instruct, and address, movement patterns,, therapeutic interventions,, postural abnormalities,, soft tissue restrictions,, ROM,, strength,, functional mobility,, body mechanics/ergonomics, and home and community integration, to attain remaining goals. Progress toward goals / Updated goals:    1. Pt will be independent with HEP at D/C for self management. 2. Pt will demonstrate DF AROM to at least 10 deg to improve stair negotiation. 3. Pt will ambulate 5 minutes without assistive device to progress to return to [facility]. 4. Pt will increase FOTO Functional Status score to 76 to decrease functional limitations. 5. Pt will demonstrate x10 standing heel raises to engage in age appropriate activities.    -perform SL Leg Press     PLAN    [x]  Upgrade activities as tolerated YES Continue plan of care   []  Discharge due to :    []  Other:      Therapist: Richard Mendiola DPT    Date: 4/21/2022 Time: 9:48 AM     Future Appointments   Date Time Provider Swathi Olivares   4/21/2022 10:15 AM Diann Flavin [facility]   4/25/2022 10:15 AM Diann Flavin [facility]   4/28/2022 10:15 AM Diann Flavin [facility]   5/3/2022  8:00 AM Diann Flavin [facility]   5/5/2022  8:00 AM Diann Flavin [facility]   5/10/2022  8:00 AM Diann Flavin [facility]   5/12/2022  8:00 AM Diann Flavin [facility]   5/17/2022  8:00 AM Diann Flavin [facility]   5/19/2022  8:00 AM Diann Flavin [facility]   5/24/2022  8:00 AM Diann Flavin [facility]   5/26/2022  8:00 AM Diann Flavin [facility]   5/31/2022  8:00 AM Leann, [address]

## 2022-04-25 ENCOUNTER — HOSPITAL ENCOUNTER (OUTPATIENT)
Dept: PHYSICAL THERAPY | Age: 32
Discharge: HOME | End: 2022-04-25
Payer: COMMERCIAL

## 2022-04-25 PROCEDURE — 97110 THERAPEUTIC EXERCISES: CPT

## 2022-04-25 NOTE — PROGRESS NOTES
PHYSICAL THERAPY - DAILY TREATMENT NOTE    Patient Name: Victoria Oakley        Date: 2022  : 1990   YES Patient  Verified  Visit #:     Insurance: Payor: Jeremy Coombs / Plan: [facility] PPO / Product Type: PPO /      In time: 419 Out time: 110   Total Treatment Time: 50     Medicare/[facility] Time Tracking (below)   Total Timed Codes (min):  50 1:1 Treatment Time:  50     TREATMENT AREA =  Right leg pain [M79.604]    SUBJECTIVE    Pain Level (on 0 to 10 scale):  0  / 10   Medication Changes/New allergies or changes in medical history, any new surgeries or procedures? NO    If yes, update Summary List   Subjective Functional Status/Changes:  []  No changes reported     Pt reports that he feels like his leg kick out when he is walking. He has been trying to decrease. Reports back pain resolved with foamroller use after last session. Compliance with HEP  Yes [x] No []         OBJECTIVE  Therapeutic Procedures:  Min Procedure Specifics + Rationale   50(50) [x] Therapeutic Exercise    See Flowsheet   Rationale: increase ROM and increase strength to improve the patients ability to participate in ADL's         min Patient Education:  YES Reviewed HEP         Other Objective/Functional Measures:    See flowsheet for more details    Plyometric Progression  1. 2 x10 Wall taps  2. 10x Broad Jumps (minor LLE> RLE that improved with cuing)  3. 10x Line Jumps  4. 10x 90 deg Jumps  5. 8x Forward Bounding  6. 10x Lateral Bounding (decreaed anterior tibial translation with RLE landing)     Ladder Drills (2 rounds each)  1. Forward two feet in each  2. Lateral Steps  3. Forwards Shuffle     VC and demos required throughout session for proper form and increased safety         Post Treatment Pain Level (on 0 to 10) scale:   0  / 10     ASSESSMENT    Assessment/Changes in Function:     Pt is improving in ankle stability and strength.  He remains motivated within treatment session and reports no pain. []  See Progress Note/Recertification   Patient will continue to benefit from skilled PT services to analyze,, cue,, progress,, modify,, demonstrate,, instruct, and address, movement patterns,, therapeutic interventions,, postural abnormalities,, soft tissue restrictions,, ROM,, strength,, functional mobility,, body mechanics/ergonomics, and home and community integration, to attain remaining goals. Progress toward goals / Updated goals:    1. Pt will be independent with HEP at D/C for self management. 2. Pt will demonstrate DF AROM to at least 10 deg to improve stair negotiation. Added incline walking  3. Pt will ambulate 5 minutes without assistive device to progress to return to [facility]. 4. Pt will increase FOTO Functional Status score to 76 to decrease functional limitations. 5. Pt will demonstrate x10 standing heel raises to engage in age appropriate activities.       PLAN    [x]  Upgrade activities as tolerated YES Continue plan of care   []  Discharge due to :    []  Other:      Therapist: Rebecca Locke DPT    Date: 4/25/2022 Time: 9:33 AM     Future Appointments   Date Time Provider Swathi Olivares   4/25/2022 10:15 AM Yvan Pair [facility]   4/28/2022 10:15 AM Yvan Pair [facility]   5/3/2022  8:00 AM Yvan Pair [facility]   5/5/2022  8:00 AM Yvan Pair [facility]   5/10/2022  8:00 AM Yvan Pair [facility]   5/12/2022  8:00 AM Yvan Pair [facility]   5/17/2022  8:00 AM Yvan Pair [facility]   5/19/2022  8:00 AM Yvan Pair [facility]   5/24/2022  8:00 AM Yvan Pair [facility]   5/26/2022  8:00 AM Yvan Pair [facility]   5/31/2022  8:00 AM Leann, [address]

## 2022-04-28 ENCOUNTER — APPOINTMENT (OUTPATIENT)
Dept: PHYSICAL THERAPY | Age: 32
End: 2022-04-28
Payer: COMMERCIAL

## 2022-05-03 ENCOUNTER — APPOINTMENT (OUTPATIENT)
Dept: PHYSICAL THERAPY | Age: 32
End: 2022-05-03
Payer: COMMERCIAL

## 2022-05-05 ENCOUNTER — APPOINTMENT (OUTPATIENT)
Dept: PHYSICAL THERAPY | Age: 32
End: 2022-05-05
Payer: COMMERCIAL

## 2022-05-10 ENCOUNTER — HOSPITAL ENCOUNTER (OUTPATIENT)
Dept: PHYSICAL THERAPY | Age: 32
Discharge: HOME | End: 2022-05-10
Payer: COMMERCIAL

## 2022-05-10 PROCEDURE — 97110 THERAPEUTIC EXERCISES: CPT

## 2022-05-10 NOTE — PROGRESS NOTES
[address] [facility] PHYSICAL THERAPY  [address] [facility], [address] - Phone: [phone]  Fax: [phone]  Progress Note/CONTINUED PLAN OF CARE for PHYSICAL THERAPY          Patient Name: Althea Tirado : 1990   Treatment/Medical Diagnosis: Right leg pain [M79.604]   Referral Source: Lily Hargrove, [facility] Marj Saavedra Start of Care [facility]): 3/3/2022   Prior Hospitalization: See Medical History Provider #:  [id number]   Medications: Verified on Patient Summary List   Visits from [facility]: 11 Missed Visits: 2   GOALS               Goal/Measure of Progress Goal Met? 1.  Pt will be independent with HEP at D/C for self management. Status at last Eval: Cues Needed Current Status: Cues Needed NOT MET   2.  Pt will demonstrate DF AROM to at least 10 deg to improve stair negotiation   Status at last Eval: 5 deg Current Status: 8 deg NOT MET   3.  Pt will ambulate 5 minutes without assistive device to progress to return to PLOF. Status at last Eval: Ambulated with [place] Current Status: No AD needed. No Limit MET              4.  Pt will increase FOTO Functional Status score to 76 to decrease functional limitations. Status at last Eval: 55/100 Current Status: 77/100 MET   5.  Pt will demonstrate x10 standing heel raises to engage in age appropriate activities.    Status at last Eval: NT Current Status: 18 Single HR MET        SUMMARY OF TREATMENT  Patient's POC has consisted of therex, therapeutic activities, manual therapy prn, modalities prn, pt. education, and a comprehensive HEP. Treatment strategies used to address functional mobility deficits, ROM deficits, strength deficits, analyze and address soft tissue restrictions, analyze and cue movement patterns, analyze and modify body mechanics/ergonomics, assess and modify postural abnormalities and instruct in home and community integration. CURRENT STATUS  Marine Hough reports 80% improvement since start of care.  He reports difficulty with jumping, running and negotiating stairs. He denies any pain in the past few weeks. AROM and strength was reassessed and demonstrated moderate improvements in all planes. Eversion has made significant improvement since previous progress note. During reassessment he was able to run, squat and negotiate stairs without any pain or signs of apprehension. There was also no significant asymmetries between lower extremities.     Current Pain: 0/10  Worst Pain: 0/10  Least Pain: 0/10         AROM Strenth   Ankle Plantarflexion  55 5     Inversion 40 5     Eversion 20 5     Dorsiflexion (neutral) 8 5               New Goals to be achieved in   3-5  weeks:  1. Pt will demonstrate DF AROM to at least 10 deg to improve stair negotiation  2. Pt will be independent with HEP at D/C for self management. Frequency / Duration:   Patient to be seen   2-1   times per week for   3-5    weeks:    RECOMMENDATIONS: Progress patient towards independent HEP to facilitate self-management of symptoms and progress gains after PT. If you have any questions/comments please contact us directly at [phone]. Thank you for allowing us to assist in the care of your patient. Therapist Signature: Christal Fraga DPT Date: 3/10/4091   Certification Period:  Reporting Period: NA  NA Time: 7:10 AM   NOTE TO PHYSICIAN:  PLEASE COMPLETE THE ORDERS BELOW AND FAX TO   [facility]: [phone]. If you are unable to process this request in 24 hours please contact our office: [phone].    ___ I have read the above report and request that my patient continue as recommended.   ___ I have read the above report and request that my patient continue therapy with the following changes/special instructions: ________________________________________________   ___ I have read the above report and request that my patient be discharged from therapy.      Physician Signature:        Date:       Time:    Toña Meyers, [address] Mar[address] Saavedra

## 2022-05-10 NOTE — PROGRESS NOTES
PHYSICAL THERAPY - DAILY TREATMENT NOTE    Patient Name: Da Garcia        Date: 5/10/2022  : 1990   YES Patient  Verified  Visit #:     Insurance: Payor: Keke Glaser / Plan: [facility] PPO / Product Type: PPO /      In time: 810 Out time: 850   Total Treatment Time: 40     Medicare/[employer] Time Tracking (below)   Total Timed Codes (min):  40 1:1 Treatment Time:  40     TREATMENT AREA =  Right leg pain [M79.604]    SUBJECTIVE    Pain Level (on 0 to 10 scale):  0  / 10   Medication Changes/New allergies or changes in medical history, any new surgeries or procedures? NO    If yes, update Summary List   Subjective Functional Status/Changes:  []  No changes reported     Venkat Can reports 80% improvement since start of care. He still has difficulty with jumping, running and negotiating stairs. Current Pain: 0/10  Worst Pain: 0/10  Least Pain: 0/10    Compliance with HEP  Yes [] No []         OBJECTIVE  Therapeutic Procedures:  Min Procedure Specifics + Rationale   40(40) [x] Therapeutic Exercise    See Flowsheet   Rationale: increase ROM and increase strength to improve the patients ability to participate in ADL's       min Patient Education:  YES Reviewed HEP         Other Objective/Functional Measures:    See flowsheet for more details    See PM   VC and demos required throughout session for proper form and increased safety         Post Treatment Pain Level (on 0 to 10) scale:   0  / 10     ASSESSMENT    Assessment/Changes in Function:     Venkat Can reports 80% improvement since start of care. He reports difficulty with jumping, running and negotiating stairs. He denies any pain in the past few weeks. AROM and strength was reassessed and demonstrated moderate improvements in all planes. Eversion has made significant improvement since previous progress note. During reassessment he was able to run, squat and negotiate stairs without any pain or signs of apprehension.  There was also no significant asymmetries between lower extremities. Current Pain: 0/10  Worst Pain: 0/10  Least Pain: 0/10        AROM Strenth   Ankle Plantarflexion  55 5     Inversion 40 5     Eversion 20 5     Dorsiflexion (neutral) 8 5         []  See Progress Note/Recertification   Patient will continue to benefit from skilled PT services to analyze,, cue,, progress,, modify,, demonstrate,, instruct, and address, movement patterns,, therapeutic interventions,, postural abnormalities,, soft tissue restrictions,, ROM,, strength,, functional mobility,, body mechanics/ergonomics, and home and community integration, to attain remaining goals. Progress toward goals / Updated goals:            Goal/Measure of Progress Goal Met? 1. Pt will be independent with HEP at D/C for self management. Status at last Eval: Cues Needed Current Status: Cues needed NOT MET   2. Pt will demonstrate DF AROM to at least 10 deg to improve stair negotiation   Status at last Eval: 5 deg Current Status: 8 deg NOT MET   3. Pt will ambulate 5 minutes without assistive device to progress to return to [facility]. Status at last Eval: Ambulated with Toby Netters Current Status: No AD needed. No Limit MET      4. Pt will increase FOTO Functional Status score to 76 to decrease functional limitations. Status at last Eval: 55/100 Current Status: 77/100 MET   5. Pt will demonstrate x10 standing heel raises to engage in age appropriate activities.     Status at last Eval: NT Current Status: 18 Single HR MET         PLAN    [x]  Upgrade activities as tolerated YES Continue plan of care   []  Discharge due to :    []  Other:      Therapist: Chan Lance DPT    Date: 5/10/2022 Time: 7:07 AM     Future Appointments   Date Time Provider Swathi Olivares   5/10/2022  8:00 AM Alcide Gibney [facility]   5/12/2022  8:00 AM Alcide Gibney [facility]   5/17/2022  8:00 AM [address] [facility]   5/19/2022  8:00 AM Sapna Noriega [address] 5/24/2022  8:00 AM Rafael Bile [facility]   5/26/2022  8:00 AM Rafael Bile [facility]   5/31/2022  8:00 AM Leann, [address]

## 2022-05-12 ENCOUNTER — APPOINTMENT (OUTPATIENT)
Dept: PHYSICAL THERAPY | Age: 32
End: 2022-05-12
Payer: COMMERCIAL

## 2022-05-17 ENCOUNTER — APPOINTMENT (OUTPATIENT)
Dept: PHYSICAL THERAPY | Age: 32
End: 2022-05-17
Payer: COMMERCIAL

## 2022-05-19 ENCOUNTER — APPOINTMENT (OUTPATIENT)
Dept: PHYSICAL THERAPY | Age: 32
End: 2022-05-19
Payer: COMMERCIAL

## 2022-05-26 ENCOUNTER — APPOINTMENT (OUTPATIENT)
Dept: PHYSICAL THERAPY | Age: 32
End: 2022-05-26
Payer: COMMERCIAL

## 2022-05-31 ENCOUNTER — HOSPITAL ENCOUNTER (OUTPATIENT)
Dept: PHYSICAL THERAPY | Age: 32
Discharge: HOME | End: 2022-05-31
Payer: COMMERCIAL

## 2022-05-31 PROCEDURE — 97110 THERAPEUTIC EXERCISES: CPT

## 2022-05-31 NOTE — PROGRESS NOTES
PHYSICAL THERAPY - DAILY TREATMENT NOTE    Patient Name: Stephanie Rainey        Date: 2022  : 1990   YES Patient  Verified  Visit #:     Insurance: Payor: Eri Espinozaer / Plan: [facility] PPO / Product Type: PPO /      In time: 800 Out time: 830   Total Treatment Time: 30     Medicare/[employer] Time Tracking (below)   Total Timed Codes (min):  30 1:1 Treatment Time:  30     TREATMENT AREA =  Right leg pain [M79.604]    SUBJECTIVE    Pain Level (on 0 to 10 scale):  0  / 10   Medication Changes/New allergies or changes in medical history, any new surgeries or procedures? NO    If yes, update Summary List   Subjective Functional Status/Changes:  []  No changes reported     See DC    Compliance with HEP  Yes [x] No []         OBJECTIVE  Therapeutic Procedures:  Min Procedure Specifics + Rationale   30(30) [x] Therapeutic Exercise    See Flowsheet   Rationale: increase ROM and increase strength to improve the patients ability to participate in ADL's       min Patient Education:  YES Reviewed HEP         Other Objective/Functional Measures:    See flowsheet for more details    See DC    no VC and demos required throughout session for proper form and increased safety         Post Treatment Pain Level (on 0 to 10) scale:   0  / 10     ASSESSMENT    Assessment/Changes in Function:     Roxann Avila reports 85% improvement since start of care. He continues to report difficulty with jumping, running and negotiating stairs. However, he requests to self discharge today. Progress from last PN is limited secondary to missing 5 appointments since. Missed some of those appointments secondary to Covid-19 exposure. He denies any pain in the past few weeks. He has met all short and long term goals but one.  DF AROM remains unchanged since last PN.       []  See Progress Note/Recertification   Patient will continue to benefit from skilled PT services to analyze,, cue,, progress,, modify,, demonstrate,, instruct, and address, movement patterns,, therapeutic interventions,, postural abnormalities,, soft tissue restrictions,, ROM,, strength,, functional mobility,, body mechanics/ergonomics, and home and community integration, to attain remaining goals. Progress toward goals / Updated goals:    1. Pt will demonstrate DF AROM to at least 10 deg to improve stair negotiation  NOT MET: 8 deg  2. Pt will be independent with HEP at D/C for self management. MET       PLAN    [x]  Upgrade activities as tolerated NO Continue plan of care   []  Discharge due to :    []  Other:      Therapist: Crista Munoz DPT    Date: 5/31/2022 Time: 8:01 AM   No future appointments.

## 2022-05-31 NOTE — PROGRESS NOTES
[address] [place] [address] PHYSICAL THERAPY  [address] [facility] Jenni Vigil, [address] - Phone: [phone]  Fax: [phone] SUMMARY FOR PHYSICAL THERAPY          Patient Name: Vipul Hanley : 1990   Treatment/Medical Diagnosis: Right leg pain [M79.604]   Referral Source: Reza Rodriguez, On license of [facility] Marj Saavedra Start of Care [facility]): 3/3/2022   Prior Hospitalization: See Medical History Provider #: [id number]   Medications: Verified on Patient Summary List   Visits from [facility]: 12 Missed Visits: 5     GOALS     1. Pt will demonstrate DF AROM to at least 10 deg to improve stair negotiation  NOT MET: 8 deg  2. Pt will be independent with HEP at D/C for self management. MET      SUMMARY OF TREATMENT  Patient's POC has consisted of therex, therapeutic activities, manual therapy prn, modalities prn, pt. education, and a comprehensive HEP. Treatment strategies used to address functional mobility deficits, ROM deficits, strength deficits, analyze and address soft tissue restrictions, analyze and cue movement patterns, analyze and modify body mechanics/ergonomics, assess and modify postural abnormalities and instruct in home and community integration. Key Functional Changes/Progress:   Stephanie Mercado reports 85% improvement since start of care. He continues to report difficulty with jumping, running and negotiating stairs. However, he requests to self discharge today. Progress from last PN is limited secondary to missing 5 appointments since. Missed some of those appointments secondary to Covid-19 exposure. He denies any pain in the past few weeks. He has met all short and long term goals but one. DF AROM remains unchanged since last PN. Assessments/Recommendations: Discontinue therapy. Progressing towards or have reached established goals. If you have any questions/comments please contact us directly at [phone]. Thank you for allowing us to assist in the care of your patient.     Therapist Signature:  Rozina Carmona DPT Date: 5/31/2022   Reporting Period: NA Time: 8:62 AM      Certification Period: NA       NOTE TO PHYSICIAN:  PLEASE COMPLETE THE ORDERS BELOW AND FAX TO   [facility]: [phone]. If you are unable to process this request in 24 hours please contact our office: [phone].    ___ I have read the above report and request that my patient be discharged from therapy.      Physician Signature:        Date:       Time:    Jackeline Moreau, [address] Sa[address]ra